# Patient Record
Sex: FEMALE | Race: WHITE | NOT HISPANIC OR LATINO | Employment: PART TIME | URBAN - METROPOLITAN AREA
[De-identification: names, ages, dates, MRNs, and addresses within clinical notes are randomized per-mention and may not be internally consistent; named-entity substitution may affect disease eponyms.]

---

## 2017-10-20 ENCOUNTER — TELEPHONE (OUTPATIENT)
Dept: PAIN MEDICINE | Facility: CLINIC | Age: 52
End: 2017-10-20

## 2017-10-20 NOTE — TELEPHONE ENCOUNTER
Patient calling for information. Information about Coolief mailed to patient. Patient states she will give to her PCP and have him send a referral. I acknowledged.

## 2017-10-20 NOTE — TELEPHONE ENCOUNTER
"----- Message from April Handley sent at 10/20/2017  1:51 PM CDT -----  Contact: PT  PT is calling regarding a "coolief" process she heard on the radio being advertised.  PT stated it's suppose to help with back and knee pain and it's meant to help those areas to improve and to avoid surgery.    PT specifically asked about Dr. Link. To see if he can perform this process and if her insurance would cover this.PT stated she has [employer].     PT asked for all information to mailed to her [address] address if there is any info on it.     Callback: [phone]    "

## 2018-05-10 ENCOUNTER — TELEPHONE (OUTPATIENT)
Dept: ADMINISTRATIVE | Facility: HOSPITAL | Age: 53
End: 2018-05-10

## 2023-01-27 ENCOUNTER — TELEPHONE (OUTPATIENT)
Dept: GASTROENTEROLOGY | Facility: CLINIC | Age: 58
End: 2023-01-27

## 2023-01-27 NOTE — TELEPHONE ENCOUNTER
Return call to patient. Inform patient of Dr. Garcia schedule. Patient declined all other appoitment. Patient only wants to see Dr. Garcia.

## 2023-01-27 NOTE — TELEPHONE ENCOUNTER
----- Message from Faith Recio sent at 1/27/2023 11:57 AM CST -----  Regarding: Appt  Contact: Pt [phone]  [facility] called to schedule appt for Gerd on behalf of patient no available dates Please call to discuss further

## 2023-05-31 ENCOUNTER — PATIENT MESSAGE (OUTPATIENT)
Dept: RESEARCH | Facility: HOSPITAL | Age: 58
End: 2023-05-31